# Patient Record
Sex: MALE | NOT HISPANIC OR LATINO
[De-identification: names, ages, dates, MRNs, and addresses within clinical notes are randomized per-mention and may not be internally consistent; named-entity substitution may affect disease eponyms.]

---
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
## 2021-10-13 ENCOUNTER — NON-APPOINTMENT (OUTPATIENT)
Age: 21
End: 2021-10-13

## 2021-10-13 ENCOUNTER — APPOINTMENT (OUTPATIENT)
Dept: ORTHOPEDIC SURGERY | Facility: CLINIC | Age: 21
End: 2021-10-13
Payer: MEDICAID

## 2021-10-13 VITALS
DIASTOLIC BLOOD PRESSURE: 62 MMHG | WEIGHT: 158 LBS | HEIGHT: 71 IN | BODY MASS INDEX: 22.12 KG/M2 | HEART RATE: 50 BPM | SYSTOLIC BLOOD PRESSURE: 96 MMHG

## 2021-10-13 DIAGNOSIS — S62.621A DISPLACED FRACTURE OF MIDDLE PHALANX OF LEFT INDEX FINGER, INITIAL ENCOUNTER FOR CLOSED FRACTURE: ICD-10-CM

## 2021-10-13 PROCEDURE — 99203 OFFICE O/P NEW LOW 30 MIN: CPT

## 2021-10-13 NOTE — HISTORY OF PRESENT ILLNESS
[Right] : right hand dominant [FreeTextEntry1] : He comes in today for evaluation of a left index finger injury which occurred about 3 weeks ago. He injured the finger while playing basketball. He was told by his PCP , Dr. Rouse he had an avulsion fracture. He complains of swelling and a lack of motion to the digit. He rates his pain as an 8 out of 10 at this time.

## 2021-10-13 NOTE — PHYSICAL EXAM
[de-identified] : - Constitutional: This is a male in no obvious distress.  \par - Psych: Patient is alert and oriented to person, place and time.  Patient has a normal mood and affect.\par - Cardiovascular: Normal pulses throughout the upper extremities.  No significant varicosities are noted in the upper extremities. \par - Neuro: Strength and sensation are intact throughout the upper extremities.  Patient has normal coordination.\par - Respiratory:  Patient exhibits no evidence of shortness of breath or difficulty breathing.\par - Skin: No rashes, lesions, or other abnormalities are noted in the upper extremities.\par \par --- \par \par Examination of his left index finger demonstrates mild swelling.  There is mild tenderness along the collateral ligaments of the PIP joint as well as the volar plate.  He has full extension with some limitation terminal flexion actively.  There is no instability of the PIP joint volar plate or collateral ligaments.  His flexor and extensor tendons are intact.  He is neurovascular intact distally. [de-identified] : I reviewed his radiographs from 9/28/21. These demonstrated a small volar plate avulsion fracture off of the volar base of the middle phalanx.

## 2021-10-13 NOTE — DISCUSSION/SUMMARY
[FreeTextEntry1] : He has findings consistent with a left index finger PIP joint volar plate avulsion fracture without instability.\par \par I had a discussion with the patient regarding today's visit, the prognosis of this diagnosis, and treatment recommendations and options. At this time, he was instructed on use of compressive Coban wrap to reduce swelling as well as gentle range of motion exercises into flexion of the left index finger.  He was instructed to avoid hyperextension at the PIP joint.  Additionally, he was instructed on activity modification. He will follow up in 3 weeks for repeat xrays.\par \par He has agreed to the above plan of management and has expressed full understanding.  All questions were fully answered to their satisfaction. \par \par My cumulative time spent on this visit included: Preparation for the visit, review of the medical records, review of pertinent diagnostic studies, examination and counseling of the patient on the above diagnosis, treatment plan and prognosis, orders of diagnostic tests, medication and/or appropriate procedures and documentation in the medical records of today's visit.

## 2021-10-13 NOTE — ADDENDUM
[FreeTextEntry1] : I, Deanna Ndiaye, acted solely as a scribe for Dr. Dillard on this date on 10/13/2021.

## 2021-10-13 NOTE — END OF VISIT
[FreeTextEntry3] : This note was written by Deanna Ndiaye on 10/13/2021 acting solely as a scribe for Dr. Iain Dillard.\par  \par All medical record entries made by the Scribe were at my, Dr. Iain Dillard, direction and personally dictated by me on 10/13/2021. I have personally reviewed the chart and agree that the record accurately reflects my personal performance of the history, physical exam, assessment and plan.